# Patient Record
Sex: FEMALE | Race: WHITE | NOT HISPANIC OR LATINO | URBAN - METROPOLITAN AREA
[De-identification: names, ages, dates, MRNs, and addresses within clinical notes are randomized per-mention and may not be internally consistent; named-entity substitution may affect disease eponyms.]

---

## 2024-11-26 ENCOUNTER — OFFICE VISIT (OUTPATIENT)
Dept: URGENT CARE | Age: 69
End: 2024-11-26
Payer: COMMERCIAL

## 2024-11-26 VITALS
WEIGHT: 169 LBS | DIASTOLIC BLOOD PRESSURE: 81 MMHG | RESPIRATION RATE: 16 BRPM | HEIGHT: 61 IN | HEART RATE: 83 BPM | SYSTOLIC BLOOD PRESSURE: 125 MMHG | OXYGEN SATURATION: 98 % | BODY MASS INDEX: 31.91 KG/M2 | TEMPERATURE: 98.3 F

## 2024-11-26 DIAGNOSIS — J34.0 CELLULITIS OF EXTERNAL NOSE: ICD-10-CM

## 2024-11-26 DIAGNOSIS — J01.10 ACUTE NON-RECURRENT FRONTAL SINUSITIS: Primary | ICD-10-CM

## 2024-11-26 RX ORDER — ATORVASTATIN CALCIUM 10 MG/1
10 TABLET, FILM COATED ORAL DAILY
COMMUNITY

## 2024-11-26 RX ORDER — MUPIROCIN 20 MG/G
OINTMENT TOPICAL
Qty: 22 G | Refills: 0 | Status: SHIPPED | OUTPATIENT
Start: 2024-11-26 | End: 2024-12-06

## 2024-11-26 RX ORDER — AMOXICILLIN AND CLAVULANATE POTASSIUM 875; 125 MG/1; MG/1
875 TABLET, FILM COATED ORAL 2 TIMES DAILY
Qty: 20 TABLET | Refills: 0 | Status: SHIPPED | OUTPATIENT
Start: 2024-11-26

## 2024-11-26 RX ORDER — OMEPRAZOLE 20 MG/1
20 TABLET, DELAYED RELEASE ORAL
COMMUNITY

## 2024-11-26 ASSESSMENT — ENCOUNTER SYMPTOMS
CARDIOVASCULAR NEGATIVE: 1
CHILLS: 0
GASTROINTESTINAL NEGATIVE: 1
RESPIRATORY NEGATIVE: 1
SINUS PAIN: 1
FEVER: 0
CONSTITUTIONAL NEGATIVE: 1
SINUS PRESSURE: 1

## 2024-11-26 NOTE — PROGRESS NOTES
"Subjective   Patient ID: Zoe Armando is a 69 y.o. female. They present today with a chief complaint of Sinus Problem.    History of Present Illness  Sinus Pain: Patient complains of left ear pressure/pain and sinus pressure. Symptoms include facial pain, nasal congestion, and sinus pressure with no fever, chills, night sweats or weight loss. Onset of symptoms was 1 week ago, gradually worsening since that time. She is drinking moderate amounts of fluids.  Past history is significant for no history of pneumonia or bronchitis. Patient is non-smoker. Pt also has an abrasion to the tip of her nose that is red and painful to the touch.         History provided by:  Patient      Past Medical History  Allergies as of 11/26/2024    (No Known Allergies)       (Not in a hospital admission)       No past medical history on file.    No past surgical history on file.         Review of Systems  Review of Systems   Constitutional: Negative.  Negative for chills and fever.   HENT:  Positive for congestion, ear discharge, ear pain, sinus pressure and sinus pain.    Respiratory: Negative.     Cardiovascular: Negative.    Gastrointestinal: Negative.    All other systems reviewed and are negative.                                 Objective    Vitals:    11/26/24 1411   BP: 125/81   Pulse: 83   Resp: 16   Temp: 36.8 °C (98.3 °F)   SpO2: 98%   Weight: 76.7 kg (169 lb)   Height: 1.549 m (5' 1\")     No LMP recorded.    Physical Exam  Vitals and nursing note reviewed.   Constitutional:       General: She is not in acute distress.     Appearance: Normal appearance. She is ill-appearing.   HENT:      Head: Atraumatic.      Right Ear: Hearing normal. A middle ear effusion is present.      Left Ear: Hearing normal. A middle ear effusion is present. Tympanic membrane is bulging.      Nose: Mucosal edema, congestion and rhinorrhea present. Rhinorrhea is clear and purulent.      Left Turbinates: Swollen.      Left Sinus: Frontal sinus " tenderness present.      Mouth/Throat:      Lips: Pink.      Mouth: Mucous membranes are moist.      Pharynx: Uvula midline. Posterior oropharyngeal erythema and postnasal drip present.   Eyes:      Conjunctiva/sclera: Conjunctivae normal.      Pupils: Pupils are equal, round, and reactive to light.      Comments: Recent Right eye surgery   Cardiovascular:      Rate and Rhythm: Normal rate and regular rhythm.      Pulses: Normal pulses.      Heart sounds: Normal heart sounds.   Pulmonary:      Effort: Pulmonary effort is normal.      Breath sounds: Normal breath sounds.   Musculoskeletal:      Cervical back: Neck supple.   Lymphadenopathy:      Cervical: No cervical adenopathy.   Skin:     General: Skin is warm and dry.      Capillary Refill: Capillary refill takes less than 2 seconds.   Neurological:      Mental Status: She is alert and oriented to person, place, and time.   Psychiatric:         Behavior: Behavior normal.         Procedures    Point of Care Test & Imaging Results from this visit  No results found for this visit on 11/26/24.   No results found.    Diagnostic study results (if any) were reviewed by AMY Marks.    Assessment/Plan   Allergies, medications, history, and pertinent labs/EKGs/Imaging reviewed by AMY Marks.     Medical Decision Making  Risks, benefits, and alternatives of the medications and treatment plan prescribed today were discussed, and patient expressed understanding. Plan follow up as discussed or as needed if any worsening symptoms or change in condition. Reinforced red flags including (but not limited to): severe or worsening pain; difficulty swallowing; stiff neck; shortness of breath; coughing or vomiting blood; chest pain; and new or increased fever are indications to go to the Emergency Department.  At time of discharge patient was clinically well-appearing and HDS for outpatient management. The patient and/or family was educated regarding  diagnosis, supportive care, OTC and Rx medications. The patient and/or family was given the opportunity to ask questions prior to discharge.  They verbalized understanding of my discussion of the plans for treatment, expected course, indications to return to  or seek further evaluation in ED, and the need for timely follow up as directed.   They were provided with a work/school excuse if requested. The after-visit summary was given to the patient and care instructions were reviewed with the patient. All questions were answered and the patient verbalized understanding of the plan of care for today.      Orders and Diagnoses  Diagnoses and all orders for this visit:  Acute non-recurrent frontal sinusitis  -     amoxicillin-pot clavulanate (Augmentin) 875-125 mg tablet; Take 1 tablet (875 mg) by mouth 2 times a day.  Cellulitis of external nose  -     amoxicillin-pot clavulanate (Augmentin) 875-125 mg tablet; Take 1 tablet (875 mg) by mouth 2 times a day.  -     mupirocin (Bactroban) 2 % ointment; Apply topically 3 times a day for 10 days.      Medical Admin Record      Patient disposition: Home    Electronically signed by AMY Marks  4:04 PM